# Patient Record
Sex: MALE | Race: BLACK OR AFRICAN AMERICAN | Employment: FULL TIME | URBAN - METROPOLITAN AREA
[De-identification: names, ages, dates, MRNs, and addresses within clinical notes are randomized per-mention and may not be internally consistent; named-entity substitution may affect disease eponyms.]

---

## 2017-03-20 ENCOUNTER — HOSPITAL ENCOUNTER (EMERGENCY)
Age: 22
Discharge: HOME | End: 2017-03-20
Attending: EMERGENCY MEDICINE
Payer: SELF-PAY

## 2017-03-20 VITALS
DIASTOLIC BLOOD PRESSURE: 95 MMHG | TEMPERATURE: 98.8 F | SYSTOLIC BLOOD PRESSURE: 142 MMHG | OXYGEN SATURATION: 100 % | RESPIRATION RATE: 13 BRPM | HEART RATE: 64 BPM | BODY MASS INDEX: 27.31 KG/M2 | HEIGHT: 64 IN | WEIGHT: 160 LBS

## 2017-03-20 DIAGNOSIS — S20.212A RIB CONTUSION, LEFT, INITIAL ENCOUNTER: ICD-10-CM
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
## 2017-03-20 PROCEDURE — 99283 EMERGENCY DEPT VISIT LOW MDM: CPT

## 2017-03-20 PROCEDURE — 74011250637 HC RX REV CODE- 250/637: Performed by: EMERGENCY MEDICINE

## 2017-03-20 RX ORDER — IBUPROFEN 400 MG/1
800 TABLET ORAL
Status: COMPLETED | OUTPATIENT
Start: 2017-03-20 | End: 2017-03-20

## 2017-03-20 RX ADMIN — IBUPROFEN 800 MG: 400 TABLET, FILM COATED ORAL at 23:03
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
## 2017-03-21 NOTE — ED NOTES
I have reviewed discharge instructions with the patient. The patient verbalized understanding. Patient armband removed and shredded. VSS.
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
## 2019-09-24 ENCOUNTER — HOSPITAL ENCOUNTER (EMERGENCY)
Age: 24
Discharge: HOME | End: 2019-09-24
Attending: EMERGENCY MEDICINE | Admitting: EMERGENCY MEDICINE
Payer: SELF-PAY

## 2019-09-24 VITALS
OXYGEN SATURATION: 99 % | TEMPERATURE: 98.7 F | HEIGHT: 63 IN | DIASTOLIC BLOOD PRESSURE: 80 MMHG | SYSTOLIC BLOOD PRESSURE: 115 MMHG | RESPIRATION RATE: 18 BRPM | HEART RATE: 89 BPM | BODY MASS INDEX: 26.58 KG/M2 | WEIGHT: 150 LBS

## 2019-09-24 DIAGNOSIS — J00 ACUTE RHINITIS: Primary | ICD-10-CM

## 2019-09-24 DIAGNOSIS — H92.02 EAR PAIN, LEFT: ICD-10-CM

## 2019-09-24 DIAGNOSIS — R05.9 COUGH: ICD-10-CM

## 2019-09-24 PROCEDURE — 99282 EMERGENCY DEPT VISIT SF MDM: CPT

## 2019-09-24 RX ORDER — BENZONATATE 100 MG/1
100 CAPSULE ORAL
Qty: 21 CAP | Refills: 0 | Status: SHIPPED | OUTPATIENT
Start: 2019-09-24 | End: 2019-10-01

## 2019-09-24 NOTE — DISCHARGE INSTRUCTIONS
Patient Education        Cough: Care Instructions  Your Care Instructions    A cough is your body's response to something that bothers your throat or airways. Many things can cause a cough. You might cough because of a cold or the flu, bronchitis, or asthma. Smoking, postnasal drip, allergies, and stomach acid that backs up into your throat also can cause coughs. A cough is a symptom, not a disease. Most coughs stop when the cause, such as a cold, goes away. You can take a few steps at home to cough less and feel better. Follow-up care is a key part of your treatment and safety. Be sure to make and go to all appointments, and call your doctor if you are having problems. It's also a good idea to know your test results and keep a list of the medicines you take. How can you care for yourself at home? · Drink lots of water and other fluids. This helps thin the mucus and soothes a dry or sore throat. Honey or lemon juice in hot water or tea may ease a dry cough. · Take cough medicine as directed by your doctor. · Prop up your head on pillows to help you breathe and ease a dry cough. · Try cough drops to soothe a dry or sore throat. Cough drops don't stop a cough. Medicine-flavored cough drops are no better than candy-flavored drops or hard candy. · Do not smoke. Avoid secondhand smoke. If you need help quitting, talk to your doctor about stop-smoking programs and medicines. These can increase your chances of quitting for good. When should you call for help? Call 911 anytime you think you may need emergency care.  For example, call if:    · You have severe trouble breathing.    Call your doctor now or seek immediate medical care if:    · You cough up blood.     · You have new or worse trouble breathing.     · You have a new or higher fever.     · You have a new rash.    Watch closely for changes in your health, and be sure to contact your doctor if:    · You cough more deeply or more often, especially if you notice more mucus or a change in the color of your mucus.     · You have new symptoms, such as a sore throat, an earache, or sinus pain.     · You do not get better as expected. Where can you learn more? Go to http://maritza-jack.info/. Enter D279 in the search box to learn more about \"Cough: Care Instructions. \"  Current as of: June 9, 2019  Content Version: 12.2  © 1438-5212 [employer]. Care instructions adapted under license by [employer] (which disclaims liability or warranty for this information). If you have questions about a medical condition or this instruction, always ask your healthcare professional. [place] [address] any warranty or liability for your use of this information. Patient Education        Upper Respiratory Infection (URI) in Teens: Care Instructions  Your Care Instructions  An upper respiratory infection, also called a URI, is an infection of the nose, sinuses, or throat. Viruses or bacteria can cause URIs. Colds, the flu, and sinusitis are examples of URIs. These infections are spread by coughs, sneezes, and close contact. You may need antibiotics to treat bacterial infections. Antibiotics do not help viral infections. But you can treat most infections with home care. This may include drinking lots of fluids and taking over-the-counter pain medicine. You will probably feel better in 4 to 10 days. Follow-up care is a key part of your treatment and safety. Be sure to make and go to all appointments, and call your doctor if you are having problems. It's also a good idea to know your test results and keep a list of the medicines you take. How can you care for yourself at home? · To prevent dehydration, drink plenty of fluids, enough so that your urine is light yellow or clear like water. Choose water and other caffeine-free clear liquids until you feel better.   · Take an over-the-counter pain medicine, such as acetaminophen (Tylenol), ibuprofen (Advil, Motrin), or naproxen (Aleve). Read and follow all instructions on the label. · No one younger than 20 should take aspirin. It has been linked to Reye syndrome, a serious illness. · Before you use cough and cold medicines, check the label. These medicines may not be safe for young children or for people with certain health problems. · Be careful when taking over-the-counter cold or flu medicines and Tylenol at the same time. Many of these medicines have acetaminophen, which is Tylenol. Read the labels to make sure that you are not taking more than the recommended dose. Too much acetaminophen (Tylenol) can be harmful. · Get plenty of rest.  · Use saline (saltwater) nasal washes to help keep your nasal passages open and wash out mucus and bacteria. You can buy saline nose drops at a grocery store or drugstore. Or you can make your own at home by adding 1 teaspoon of salt and 1 teaspoon of baking soda to 2 cups of distilled water. If you make your own, fill a bulb syringe with the solution, insert the tip into your nostril, and squeeze gently. Rakan Shady your nose. · Use a vaporizer or humidifier to add moisture to your bedroom. Follow the instructions for cleaning the machine. · Do not smoke or allow others to smoke around you. If you need help quitting, talk to your doctor about stop-smoking programs and medicines. These can increase your chances of quitting for good. When should you call for help? Call 911 anytime you think you may need emergency care. For example, call if:    · You have severe trouble breathing.     · You have rapid swelling of the throat or tongue.    Call your doctor now or seek immediate medical care if:    · You have a fever with a stiff neck or a severe headache.     · You have signs of needing more fluids.  You have sunken eyes and a dry mouth, and you pass only a little dark urine.     · You cannot keep down fluids or medicine.    Watch closely for changes in your health, and be sure to contact your doctor if:    · You have a deep cough and a lot of mucus.     · You are too tired to eat or drink.     · You have a new symptom, such as a sore throat, an earache, or a rash.     · You do not get better as expected. Where can you learn more? Go to http://maritza-jack.info/. Enter A933 in the search box to learn more about \"Upper Respiratory Infection (URI) in Teens: Care Instructions. \"  Current as of: June 9, 2019  Content Version: 12.2  © 7975-0220 [employer]. Care instructions adapted under license by [employer] (which disclaims liability or warranty for this information). If you have questions about a medical condition or this instruction, always ask your healthcare professional. [address] any warranty or liability for your use of this information.

## 2019-09-24 NOTE — ED NOTES
I have reviewed discharge instructions with the patient. The patient verbalized understanding. Patient NAD, VSS and pain is 3/10 at discharge.   Patient armband removed and shredded

## 2019-09-24 NOTE — LETTER
NOTIFICATION RETURN TO WORK / SCHOOL 
 
9/24/2019 11:43 AM 
 
Mr. Jass Luis [address] [place] [phone] To Whom It May Concern: 
 
Jass Luis is currently under the care of [facility] EMERGENCY DEPT. He will return to work/school on: Today Jass Luis may return to work/school with the following restrictions: None If there are questions or concerns please have the patient contact our office.  
 
 
 
Sincerely, 
 
 
Mckinley Santiago PA-C

## 2020-03-06 ENCOUNTER — HOSPITAL ENCOUNTER (EMERGENCY)
Age: 25
Discharge: HOME | End: 2020-03-06
Attending: EMERGENCY MEDICINE | Admitting: EMERGENCY MEDICINE
Payer: COMMERCIAL

## 2020-03-06 VITALS
HEIGHT: 64 IN | DIASTOLIC BLOOD PRESSURE: 75 MMHG | RESPIRATION RATE: 16 BRPM | OXYGEN SATURATION: 100 % | TEMPERATURE: 98.8 F | BODY MASS INDEX: 25.61 KG/M2 | WEIGHT: 150 LBS | SYSTOLIC BLOOD PRESSURE: 113 MMHG

## 2020-03-06 DIAGNOSIS — J02.0 ACUTE STREPTOCOCCAL PHARYNGITIS: Primary | ICD-10-CM

## 2020-03-06 PROCEDURE — 99282 EMERGENCY DEPT VISIT SF MDM: CPT

## 2020-03-06 PROCEDURE — 87081 CULTURE SCREEN ONLY: CPT

## 2020-03-06 RX ORDER — KETOROLAC TROMETHAMINE 10 MG/1
10 TABLET, FILM COATED ORAL
Qty: 10 TAB | Refills: 0 | Status: SHIPPED | OUTPATIENT
Start: 2020-03-06

## 2020-03-06 RX ORDER — AMOXICILLIN AND CLAVULANATE POTASSIUM 500; 125 MG/1; MG/1
1 TABLET, FILM COATED ORAL 3 TIMES DAILY
Qty: 30 TAB | Refills: 0 | Status: SHIPPED | OUTPATIENT
Start: 2020-03-06 | End: 2020-03-16

## 2020-03-06 NOTE — ED TRIAGE NOTES
Patient states that he has had a sore throat for several days- now causing ear pain when swallowing- right side-patient states has yellow sputum

## 2020-03-06 NOTE — ED PROVIDER NOTES
EMERGENCY DEPARTMENT HISTORY AND PHYSICAL EXAM      Date: 3/6/2020  Patient Name: Ame Alcaraz    History of Presenting Illness     Chief Complaint   Patient presents with    Ear Pain     right    Sore Throat       History (Context): Ame Alcaraz is a 21 y.o. gentleman with no significant past medical history who presents with subacute onset, progressive, moderate to severe sore throat associated with odynophagia. Swallowing makes the pain worse. No relieving features. The patient does not engage in risky sexual practices. On review of systems, the patient denies  chills, rashes, neck stiffness, dysphagia. He endorses fever and neck pain    PCP: None    Current Outpatient Medications   Medication Sig Dispense Refill    ketorolac (TORADOL) 10 mg tablet Take 1 Tab by mouth every six (6) hours as needed for Pain for up to 20 doses. 10 Tab 0    guaiFENesin (MUCINEX) 1,200 mg Ta12 ER tablet Take 1 Tab by mouth two (2) times a day. 14 Tab 0       Past History     Past Medical History:  Past Medical History:   Diagnosis Date    Back ache        Past Surgical History:  History reviewed. No pertinent surgical history. Family History:  History reviewed. No pertinent family history. Social History:  Social History     Tobacco Use    Smoking status: Current Some Day Smoker     Packs/day: 0.01    Smokeless tobacco: Never Used   Substance Use Topics    Alcohol use: No    Drug use: No       Allergies:  No Known Allergies    PMH, PSH, family history, social history, allergies reviewed with the patient with significant items noted above. Review of Systems   As per HPI, otherwise reviewed and negative.      Physical Exam     Vitals:    03/06/20 0430 03/06/20 0531   BP: 113/75    Resp: 16    Temp: 98.8 °F (37.1 °C)    SpO2: 100% 100%   Weight: 68 kg (150 lb)    Height: 5' 4\" (1.626 m)        Gen: Well-appearing, in no acute distress   HEENT: Normocephalic, sclera anicteric, TMs clear, oropharynx: Inflamed, tonsillar exudates, right-sided lymphadenopathy  Cardiovascular: Normal rate, regular rhythm, no murmurs, rubs, gallops. Pulses intact and equal distally. Pulmonary: No respiratory distress. No stridor. Clear lungs. ABD: Soft, nontender, nondistended. Neuro: Alert. Normal speech. Normal mentation. Psych: Normal thought content and thought processes. EXT: Moves all extremities well. No cyanosis or clubbing. Skin: Warm and well-perfused. Diagnostic Study Results     Labs -     No results found for this or any previous visit (from the past 12 hour(s)). Radiologic Studies -   No orders to display     CT Results  (Last 48 hours)    None        CXR Results  (Last 48 hours)    None            Medical Decision Making   I am the first provider for this patient. I reviewed the vital signs, available nursing notes, past medical history, past surgical history, family history and social history. Vital Signs-Reviewed the patient's vital signs. Records Reviewed: Personally, on initial evaluation    MDM:   Patient presents with sore throat and odynophagia. Exam significant for tonsillar exudates, erythema, right sided lymphadenopathy. DDX considered: Strep pharyngitis, other bacterial pharyngitis, viral pharyngitis  DDX thought to be less likely but also considered due to high risk condition: RPA, Pedrito's angina, parapharyngeal space infection, PTA, Lemierre's syndrome, ICA aneurysm, parotitis, lymphoma    Patient condition on initial evaluation: Stable      Criteria Points   Age 3-14 years +1    15-44 years 0    ?45 years -1   Exudate or swelling on tonsils No 0    Yes +1   Tender/swollen anterior cervical lymph nodes No 0    Yes +1   Temp >38°C (100.4°F) No 0    Yes +1   Cough Cough present 0    Cough absent +1   Facts & Figures  Interpretation:  Centor Score Probability of strep pharyngitis Recommendation   0 1-2.5% No further testing or antibiotics.    1 5-10%    2 11-17% Optional rapid strep testing and/or culture. 3 28-35% Consider rapid strep testing and/or culture. ?4 51-53% Consider rapid strep testing and/or culture. Per the Centers for Disease Control, empiric antibiotics no longer recommended for meeting all Centor criteria. Centor: 3      Plan:   Pain Control  Close Observation    Orders as below:  Orders Placed This Encounter    STREP THROAT SCREEN    amoxicillin-clavulanate (AUGMENTIN) 500-125 mg per tablet    ketorolac (TORADOL) 10 mg tablet        ED Course:   ED Course as of Mar 17 1303   Fri Mar 06, 2020   0601 Positive   STREP THROAT SCREEN:    Special Requests: NO SPECIAL REQUESTS   Strep Screen POSITIVE   Culture result: PENDING [DT]      ED Course User Index  [DT] Isabell Hernández MD         Patient condition at time of disposition: Stable  DISCHARGE NOTE:   Pt has been reexamined. Patient has no new complaints, changes, or physical findings. Care plan outlined and precautions discussed. Results were reviewed with the patient. All medications were reviewed with the patient; will d/c home with antibiotics. All of pt's questions and concerns were addressed. Alarm symptoms and return precautions associated with chief complaint and evaluation were reviewed with the patient in detail. The patient demonstrated adequate understanding. Patient was instructed and agrees to follow up with PCP, as well as to return to the ED upon further deterioration. Patient is ready to go home. The patient is happy with this plan    Follow-up Information     Follow up With Specialties Details Why [facility] EMERGENCY DEPT Emergency Medicine  As needed, If symptoms worsen [address] [facility] 76.  [phone]          Discharge Medication List as of 3/6/2020  6:12 AM      START taking these medications    Details   amoxicillin-clavulanate (AUGMENTIN) 500-125 mg per tablet Take 1 Tab by mouth three (3) times daily for 10 days. , Print, Disp-30 Tab, R-0 ketorolac (TORADOL) 10 mg tablet Take 1 Tab by mouth every six (6) hours as needed for Pain for up to 20 doses. , Print, Disp-10 Tab, R-0         CONTINUE these medications which have NOT CHANGED    Details   guaiFENesin (MUCINEX) 1,200 mg Ta12 ER tablet Take 1 Tab by mouth two (2) times a day., Print, Disp-14 Tab, R-0             Procedures:  Procedures          Diagnosis     Clinical Impression:   1. Acute streptococcal pharyngitis        Signed,  Elena Vaca MD  Emergency Physician  [facility]    As a voice dictation software was utilized to dictate this note, minor word transpositions can occur. I apologize for confusing wording and typographic errors. Please feel free to contact me for clarification.

## 2020-03-06 NOTE — LETTER
[facility] EMERGENCY DEPT 
[address] 
[address] [facility] [id number] [phone] Work/School Note Date: 3/6/2020 To Whom It May concern: 
 
Chari Will was seen and treated today in the emergency room by the following provider(s): 
Attending Provider: Sofie Montgomery MD.   
 
Chari Will may return to work on 03/09/20.  
 
Sincerely, 
 
 
 
 
Jaky Castro RN

## 2020-03-07 LAB
B-HEM STREP THROAT QL CULT: POSITIVE
BACTERIA SPEC CULT: ABNORMAL
SERVICE CMNT-IMP: ABNORMAL
